# Patient Record
(demographics unavailable — no encounter records)

---

## 2025-03-28 NOTE — PHYSICAL EXAM
[FreeTextEntry1] : Exam today reveals she is comfortable and a splint immobilizing the right thumb there is no deformity there is mild swelling and tenderness to the proximal and distal phalanges.  No instability to light stress.  Review of outside x-rays 1/26/2025 right thumb reveals Salter II fractures of the proximal and distal phalanges.

## 2025-03-28 NOTE — HISTORY OF PRESENT ILLNESS
[FreeTextEntry1] : This 13-year-old seen today for evaluation of his right thumb.  He was well until just recently 2 days ago when he jammed the finger playing basketball.  He was seen at a local emergency room x-rays taken he was sent out in a splint.  He is comfortable on today's visit past history is noncontributory

## 2025-03-28 NOTE — CONSULT LETTER
[Dear  ___] : Dear  [unfilled], [Consult Letter:] : I had the pleasure of evaluating your patient, [unfilled]. [Please see my note below.] : Please see my note below. [Consult Closing:] : Thank you very much for allowing me to participate in the care of this patient.  If you have any questions, please do not hesitate to contact me. [Sincerely,] : Sincerely, [FreeTextEntry3] : Dr Tony Gaviria JR.

## 2025-04-17 NOTE — ASSESSMENT
[FreeTextEntry1] : Impression: Fracture right thumb.  He will return to gym and activities as tolerated in 1 week return here as needed

## 2025-04-17 NOTE — PHYSICAL EXAM
[FreeTextEntry1] : Examination today reviewed he has excellent motion to the thumb in all planes no swelling no instability on stress.  X-rays ordered and taken today of the right thumb reveals satisfactory healing of his fractures

## 2025-04-17 NOTE — HISTORY OF PRESENT ILLNESS
[FreeTextEntry1] : This 13-year-old returns for follow-up of his right thumb he is much more comfortable on today's visit

## 2025-05-16 NOTE — HISTORY OF PRESENT ILLNESS
[FreeTextEntry1] : This 13-year-old is seen today for evaluation of his left ankle.  He was well until 5 days ago when he inverted his ankle playing basketball sustaining injury.  This precipitated significant pain swelling inability to bear weight he was placed into a posterior splint at [facility] after x-rays were said to reveal a fracture.  Prior to this he has been doing well

## 2025-05-16 NOTE — ASSESSMENT
[FreeTextEntry1] : Impression: Fracture lateral malleolus left ankle.  The child has been placed into a leg cast uneventfully.  I discussed activity and care of the cast with the family nonweightbearing until further notice.  He will return in 2 weeks with x-rays of the left ankle

## 2025-05-16 NOTE — PHYSICAL EXAM
[FreeTextEntry1] : Examination today with the splint removed obvious swelling about the ankle with restricted motion he is most tender about the lateral malleolus not tender medially no significant joint line tenderness the distal foot compartments are unremarkable neurovascular status is intact.  X-rays or written reports are not available for review.  As such x-rays ordered and taken today 3 views of the left ankle reviewed interpreted by myself reveal well aligned fracture lateral malleolus

## 2025-05-30 NOTE — HISTORY OF PRESENT ILLNESS
[FreeTextEntry1] : This 13-year-old returns for follow-up of his left ankle fracture is comfortable no significant complaints noted

## 2025-05-30 NOTE — ASSESSMENT
[FreeTextEntry1] : Impression: Salter II fracture lateral malleolus left ankle.  The cast has been removed he has no local tenderness to begin motion exercises and progressive weightbearing as tolerated return to gym in 2 weeks return here as needed

## 2025-05-30 NOTE — PHYSICAL EXAM
[FreeTextEntry1] : Examination today he is comfortable in a well-fitting cast no foul smell moving his toes well.  X-rays ordered and taken today of the left ankle satisfactory alignment and ongoing healing of the fracture lateral malleolus.